# Patient Record
(demographics unavailable — no encounter records)

---

## 2024-11-30 NOTE — ASSESSMENT
[FreeTextEntry1] : 21 yo male with malnutrition and failure to gain weight, returned to care after one year with 12 lb weight loss.  Not seen for 4 months and now weight back down to 95 lbs. Recommend adult GI evaluation if unable to gain.  To refill Ensure. To have 3/day. Labs CBC, CMP, T4, TSH F/u in two weeks in-office. Support and guidance given.

## 2024-11-30 NOTE — PHYSICAL EXAM
[Normal] : supple and no neck mass was observed [de-identified] : alert thin male NAD [de-identified] : no parotid enlargement

## 2024-11-30 NOTE — HISTORY OF PRESENT ILLNESS
[de-identified] : 23 yo male here for f/u of malnutrition. Weight today 95.6 lbs, down 3.0 lbs since last weight in this office 4 months ago. Pt returned to care in December '22 after not being seen for over one year, with 11-12 lbs weight loss.   Says eating but ran out of the Ensure last month. Says recently started to eat a meal when he comes home from work.  Working FT now but will take one math class for the spring semester. No other concerns.  24 hour recall: B: "a little cereal" L: [employer] burgers (4) D: ziti, turkey, stuffing S: pizza, [place] [employer]. Breakfast

## 2025-01-11 NOTE — HISTORY OF PRESENT ILLNESS
[de-identified] : 23 yo male with malnutrition, here for follow-up for malnutrition. 5'7" tall, max weight 110 lbs in 2021.  Last visit 6 weeks ago weighed 95 lbs 9.6 oz, down 3 lbs in 4 months. Today he weighs 96 lbs, He reports no changes in his eating. He is eatig 2-3 x a day, breakfast usually cereal with cold milk, 1 bowl, lunch at work is [employer] (daily cheese burger, 5 nuggets with sauce, strawberry lemonade) or [employer]era (a bowl of daily mc n cheese or grilled cheese sandwich, chip on the side or appkle. mountain dew or iced tea), dinner mom usually makes them - rice, vegetable with meats (variety depending on what she makes. takes 3 Ensures daily (2 taken on the go to work, 1 taken at home when he gets home) He is working at [employer], 8 hours daily, working 5 days a week. No concerns at the moment.

## 2025-01-11 NOTE — HISTORY OF PRESENT ILLNESS
[de-identified] : 21 yo male with malnutrition, here for follow-up for malnutrition. 5'7" tall, max weight 110 lbs in 2021.  Last visit 6 weeks ago weighed 95 lbs 9.6 oz, down 3 lbs in 4 months. Today he weighs 96 lbs, He reports no changes in his eating. He is eatig 2-3 x a day, breakfast usually cereal with cold milk, 1 bowl, lunch at work is [employer] (daily cheese burger, 5 nuggets with sauce, strawberry lemonade) or [employer] (a bowl of daily mc n cheese or grilled cheese sandwich, chip on the side or appkle. mountain dew or iced tea), dinner mom usually makes them - rice, vegetable with meats (variety depending on what she makes. takes 3 Ensures daily (2 taken on the go to work, 1 taken at home when he gets home) He is working at [employer], 8 hours daily, working 5 days a week. No concerns at the moment.

## 2025-01-11 NOTE — ASSESSMENT
[FreeTextEntry1] : 21 yo male with malnutrition and failure to gain weight. Last seen in November, since last visit gained 1 lb. Labs were reviewed and are unremarkable. Will follow up in 2 weeks with ROYAL.

## 2025-01-11 NOTE — END OF VISIT
[] : Fellow [FreeTextEntry3] : Above note edited as appropriate, agree with above assessment and plan.

## 2025-01-27 NOTE — PHYSICAL EXAM
[Normal] : supple and no neck mass was observed [de-identified] : thin [de-identified] : alert, oriented, CN grossly normal

## 2025-01-27 NOTE — HISTORY OF PRESENT ILLNESS
[de-identified] : 23 yo male with malnutrition, here for follow-up for malnutrition. 5'7" tall, max weight 110 lbs in 2021. 2 weeks ago he weighs 96 lbs, today he weighs 98 lbs 6 oz.  He is trying new foods something different. He feels good about it.   He is working at [employer], 8 hours daily, working 5 days a week, standing all day and lifting stuff too. He lives with his 19 yo brother, mom and mom's boyfriend (who has been living with them for 1 year now) He gets outside food for lunch when he is at work. and eats at home otherwise. Mom and mom's BF make the food at home.  No concerns at the moment.

## 2025-01-27 NOTE — PHYSICAL EXAM
[Normal] : supple and no neck mass was observed [de-identified] : thin [de-identified] : alert, oriented, CN grossly normal

## 2025-01-27 NOTE — ASSESSMENT
[FreeTextEntry1] : 21 yo male with malnutrition and failure to gain weight. Last seen 2 weeks ago, gained 2 lb. He is challenging himself with trying different foods. Will follow up in 4 weeks with ROYAL.

## 2025-01-27 NOTE — HISTORY OF PRESENT ILLNESS
[de-identified] : 23 yo male with malnutrition, here for follow-up for malnutrition. 5'7" tall, max weight 110 lbs in 2021. 2 weeks ago he weighs 96 lbs, today he weighs 98 lbs 6 oz.  He is trying new foods something different. He feels good about it.   He is working at [employer], 8 hours daily, working 5 days a week, standing all day and lifting stuff too. He lives with his 19 yo brother, mom and mom's boyfriend (who has been living with them for 1 year now) He gets outside food for lunch when he is at work. and eats at home otherwise. Mom and mom's BF make the food at home.  No concerns at the moment.

## 2025-02-19 NOTE — HISTORY OF PRESENT ILLNESS
[de-identified] : 23 yo male with malnutrition, here for follow-up for malnutrition. 5'7" tall, max weight 110 lbs in 2021. 4 weeks ago he weighed 98 lbs 6 oz, today 97 lbs 9 oz. He denies any changes in his daily routine with physical activies. On Friday and Saturday - he took off for his birthday and the next day his cousin's birthday. They went to eat out with his parents, had 2 dinners on his birthday. Other than that he denies any changes in his eating. He continues to take 3 Ensures daily. He used to be on Cyproheptadine reports no change in appetite. His max weight was 110 lbs in 2021, he endorsed that he reached that weight because he is on top of things, but now is saying that he is not really being on top of his eating. He reports not eating snacks because he is not hungry. Discussed with him that he has to eat despite not feeling hungry and he has to be consistent with his eating. Last visit, RD estimated that he is consuming 2400 Gerard daily. Last seen by GI on 2023-  EGD on August 20, 2020 which was unremarkable except for lactase def. MRE performed June 30, 2020 which was unremarkable. Continues to work at [employer], 8 hours daily 5 times a week. No concerns at the moment.

## 2025-02-19 NOTE — PHYSICAL EXAM
[Normal] : supple and no neck mass was observed [de-identified] : thin [de-identified] : alert, oriented, CN grossly normal

## 2025-02-19 NOTE — ASSESSMENT
[FreeTextEntry1] : 21 yo male with malnutrition and failure to gain weight. Last seen 4 weeks ago, and lost almost 1 lb. Discussed importance of eating regularly and eating more despite not being hungry. He reports that he will try to be on top of his eating. Discussed referral to GI for re-evaluation, to rule out any pathology why he is failing to gain weight. RD discussed consistently eating on his 2 breaks at work daily. Will follow up in 4 weeks.

## 2025-04-09 NOTE — ASSESSMENT
[FreeTextEntry1] : 21 yo male with malnutrition and failure to gain weight. Last seen 2 months ago ago, and gained 1 lb. Discussed importance of being consistent with the changes and increases in his caloric intake. Will need to see GI for re-evaluation  to rule out any pathology why he is failing to gain weight. Follow up in 4 weeks

## 2025-04-09 NOTE — HISTORY OF PRESENT ILLNESS
[de-identified] : 21 yo male with malnutrition, here for follow-up for malnutrition. 5'7" tall, max weight 110 lbs in 2021. 2 months ago he weighed 97 lbs 9 oz, today 98 lbs 9.6 oz Patient has not made an appt with GI - missed the call from [facility]. Adding snacks but inconsistent 1-2 x a week only - cheese and cracker - to his usual 3 meals and 1 snacks, 3-4 ensures daily Other than that no major changes in his intake. 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 [de-identified] : see RD note.

## 2025-04-09 NOTE — HISTORY OF PRESENT ILLNESS
[de-identified] : 23 yo male with malnutrition, here for follow-up for malnutrition. 5'7" tall, max weight 110 lbs in 2021. 2 months ago he weighed 97 lbs 9 oz, today 98 lbs 9.6 oz Patient has not made an appt with GI - missed the call from [facility]. Adding snacks but inconsistent 1-2 x a week only - cheese and cracker - to his usual 3 meals and 1 snacks, 3-4 ensures daily Other than that no major changes in his intake. 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 [de-identified] : see RD note.

## 2025-05-14 NOTE — PHYSICAL EXAM
[Normal] : abdomen normal bowel sounds, soft, non-tender, non distended and no hepatosplenomegaly [de-identified] : thin [de-identified] : alert, oriented, CN grossly normal

## 2025-05-14 NOTE — ASSESSMENT
[FreeTextEntry1] : 22 yo male with malnutrition and failure to gain weight. Gained 3 lbs in 1 month. He has an appt with GI next month. Referral sent. Ensure was refilled last month. He has less physical activities on his new work. Needs to gain 1-2 lbs per week. Discussed TGW ~120 lbs. Discussed need for HLOC if not gaining adequate weight. Working with RD with his meals. Follow up in 4 weeks with me and ROYAL.

## 2025-05-14 NOTE — ASSESSMENT
[FreeTextEntry1] : 24 yo male with malnutrition and failure to gain weight. Gained 3 lbs in 1 month. He has an appt with GI next month. Referral sent. Ensure was refilled last month. He has less physical activities on his new work. Needs to gain 1-2 lbs per week. Discussed TGW ~120 lbs. Discussed need for HLOC if not gaining adequate weight. Working with RD with his meals. Follow up in 4 weeks with me and ROYAL.

## 2025-05-14 NOTE — HISTORY OF PRESENT ILLNESS
[de-identified] : 23 yo male with malnutrition, here for follow-up for malnutrition. 5'7" tall, max weight 110 lbs in 2021. 1 month ago weighed 98 lbs 9.6 oz, today 101 lbs 10 oz. Gained 3 lbs in 4 weeks. GI appt is on June 18. He needs a referral. 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 He has 1 hour lunch break, 2 15-minute breaks. Work hours is at 9-5 pm. There are more restaurant options outside his work job. No physical complaints today. 24 Hour Food Recall: see RD note. Eating around ~2000 Gerard only

## 2025-05-14 NOTE — HISTORY OF PRESENT ILLNESS
[de-identified] : 21 yo male with malnutrition, here for follow-up for malnutrition. 5'7" tall, max weight 110 lbs in 2021. 1 month ago weighed 98 lbs 9.6 oz, today 101 lbs 10 oz. Gained 3 lbs in 4 weeks. GI appt is on June 18. He needs a referral. 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 He has 1 hour lunch break, 2 15-minute breaks. Work hours is at 9-5 pm. There are more restaurant options outside his work job. No physical complaints today. 24 Hour Food Recall: see RD note. Eating around ~2000 Gerard only

## 2025-05-14 NOTE — PHYSICAL EXAM
[Normal] : abdomen normal bowel sounds, soft, non-tender, non distended and no hepatosplenomegaly [de-identified] : thin [de-identified] : alert, oriented, CN grossly normal

## 2025-06-20 NOTE — PHYSICAL EXAM
[Alert] : alert [No Acute Distress] : no acute distress [Sclera] : the sclera and conjunctiva were normal [Hearing Threshold Finger Rub Not [place]] : hearing was normal [Normal Appearance] : the appearance of the neck was normal [No Respiratory Distress] : no respiratory distress [Auscultation Breath Sounds / Voice Sounds] : lungs were clear to auscultation bilaterally [Heart Rate And Rhythm] : heart rate was normal and rhythm regular [Normal S1, S2] : normal S1 and S2 [Bowel Sounds] : normal bowel sounds [Abdomen Tenderness] : non-tender [No Masses] : no abdominal mass palpated [Abdomen Soft] : soft [Abnormal Walk] : normal gait [Normal Color / Pigmentation] : normal skin color and pigmentation [No Focal Deficits] : no focal deficits [Oriented To Time, Place, And Person] : oriented to person, place, and time [de-identified] : thin

## 2025-06-20 NOTE — PHYSICAL EXAM
[Alert] : alert [No Acute Distress] : no acute distress [Sclera] : the sclera and conjunctiva were normal [Hearing Threshold Finger Rub Not [place]] : hearing was normal [Normal Appearance] : the appearance of the neck was normal [No Respiratory Distress] : no respiratory distress [Auscultation Breath Sounds / Voice Sounds] : lungs were clear to auscultation bilaterally [Heart Rate And Rhythm] : heart rate was normal and rhythm regular [Normal S1, S2] : normal S1 and S2 [Bowel Sounds] : normal bowel sounds [Abdomen Tenderness] : non-tender [No Masses] : no abdominal mass palpated [Abdomen Soft] : soft [Abnormal Walk] : normal gait [Normal Color / Pigmentation] : normal skin color and pigmentation [No Focal Deficits] : no focal deficits [Oriented To Time, Place, And Person] : oriented to person, place, and time [de-identified] : thin

## 2025-06-20 NOTE — ASSESSMENT
[FreeTextEntry1] : 1.  Low weight with difficulty gaining weight, likely due to decreased caloric intake.  Recent labs unremarkable.  EGD in 8/2020 with lactase deficiency but otherwise normal.  MR enterography in 2020 normal.  Recs: - Recent CBC, CMP, TSH, B12 reviewed. - Prior EGD and MR enterography reviewed. - Pediatric notes reviewed. - Patient was encouraged to increase caloric intake.  He currently reports 3 meals a day with 3 Ensure.  He was advised to consider drinking fluids other than water to increase calories. - Patient was offered cyproheptadine as an appetite stimulant but declined. - Patient was advised that labs and colonoscopy could be performed to rule out organic etiologies of poor weight gain.  However, patient currently feels stable and has been slowly gaining weight and reports that his extensive testing in the past has not revealed any significant results. - Patient advised to continue working with nutritionist.

## 2025-06-20 NOTE — END OF VISIT
[FreeTextEntry3] : My collective time spent on today's visit was greater than 45 minutes and included: Preparation for the visit, review of the medical records, review of pertinent diagnostic studies, examination and counseling of the patient on the above diagnosis, treatment plan and prognosis, orders of diagnostic test, medications and or appropriate procedures and documentation in the medical record of today's visit.  [Time Spent: ___ minutes] : I have spent [unfilled] minutes of time on the encounter which excludes teaching and separately reported services.

## 2025-06-26 NOTE — ASSESSMENT
[FreeTextEntry1] : 22 yo male with malnutrition and failure to gain weight. GI offered labs and colonoscopy but he declined. Discussed to consider getting those done to rule out any GI pathologies. Otherwise has gained 3 lbs in 6 weeks. changing job helped him with less physical work. Discussed ways to increase further calories such as- smoothies while at work, etc. Follow up in 4-6 weeks with ROYAL.

## 2025-06-26 NOTE — HISTORY OF PRESENT ILLNESS
[de-identified] : 21 yo male with malnutrition, here for follow-up for malnutrition. 5'7" tall, max weight 110 lbs in 2021. 6 weeks ago he was 101 lb 10 oz, today he is 104 lbs 9.6 oz.  Rosas thinks that change of work definitely helped, current work is mostly sitting on a computer. He has been eating 2 small bags of chips in a day. He continues to eat 3 meals and 2 snacks daily, continues to consume 3 ensures in a day. He saw Mily last week, who told him to eat more - maybe eat more snacks on his day off, and trying new food, adding something to breakfast.  He was seen by GI last week, was offered cyproheptadine but declined. They offered labs and colonoscopy, but Rosas feels he is stable and is gaining slowly, and he has done labs and endoscopy before but were all normal.

## 2025-06-26 NOTE — ASSESSMENT
[FreeTextEntry1] : 24 yo male with malnutrition and failure to gain weight. GI offered labs and colonoscopy but he declined. Discussed to consider getting those done to rule out any GI pathologies. Otherwise has gained 3 lbs in 6 weeks. changing job helped him with less physical work. Discussed ways to increase further calories such as- smoothies while at work, etc. Follow up in 4-6 weeks with ROYAL.

## 2025-06-26 NOTE — HISTORY OF PRESENT ILLNESS
[de-identified] : 21 yo male with malnutrition, here for follow-up for malnutrition. 5'7" tall, max weight 110 lbs in 2021. 6 weeks ago he was 101 lb 10 oz, today he is 104 lbs 9.6 oz.  Rosas thinks that change of work definitely helped, current work is mostly sitting on a computer. He has been eating 2 small bags of chips in a day. He continues to eat 3 meals and 2 snacks daily, continues to consume 3 ensures in a day. He saw Mily last week, who told him to eat more - maybe eat more snacks on his day off, and trying new food, adding something to breakfast.  He was seen by GI last week, was offered cyproheptadine but declined. They offered labs and colonoscopy, but Rosas feels he is stable and is gaining slowly, and he has done labs and endoscopy before but were all normal.